# Patient Record
Sex: FEMALE | URBAN - METROPOLITAN AREA
[De-identification: names, ages, dates, MRNs, and addresses within clinical notes are randomized per-mention and may not be internally consistent; named-entity substitution may affect disease eponyms.]

---

## 2022-11-15 ENCOUNTER — APPOINTMENT (OUTPATIENT)
Dept: URBAN - METROPOLITAN AREA CLINIC 283 | Age: 52
Setting detail: DERMATOLOGY
End: 2022-11-29

## 2022-11-15 DIAGNOSIS — Z41.9 ENCOUNTER FOR PROCEDURE FOR PURPOSES OTHER THAN REMEDYING HEALTH STATE, UNSPECIFIED: ICD-10-CM

## 2022-11-15 PROCEDURE — OTHER FILLERS: OTHER

## 2022-11-15 PROCEDURE — OTHER BOTOX (U OR CC): OTHER

## 2022-11-15 NOTE — PROCEDURE: FILLERS
· Patient reports history of breast cancer status post resection and chemotherapy  · Currently in surveillance, follows Oncology at [facility] Filler: Juvederm Voluma XC

## 2022-11-15 NOTE — HPI: COSMETIC (BOTOX)
Have You Had Botox Before?: has had botox
When Was Your Last Botox Treatment?: At a different office

## 2022-11-15 NOTE — HPI: COSMETIC (FILLERS)
Have You Had Fillers Before?: has had fillers
When Was Your Last Filler Injection?: At a different office

## 2022-11-15 NOTE — PROCEDURE: FILLERS
Price (Use Numbers Only, No Special Characters Or $): 6906 Price (Use Numbers Only, No Special Characters Or $): 1627

## 2022-11-15 NOTE — PROCEDURE: BOTOX (U OR CC)
Additional Area 4 Units: 0
Additional Area 1 Location: [place]
Consent: Written consent obtained. Risks include but not limited to lid/brow ptosis, bruising, swelling, diplopia, temporary effect, incomplete chemical denervation.
Including Pricing Information In The Note: No
Additional Area 3 Location: Right masseter
Additional Area 2 Units: 20
Document As Units Or Cc?: units
Additional Area 2 Location: Left masseter
Price (Use Numbers Only, No Special Characters Or $): 780
Dilution (U/0.1 Cc): 4
Post-Care Instructions: Patient instructed to not lie down for 4 hours and limit physical activity for 24 hours.
Detail Level: Detailed